# Patient Record
Sex: FEMALE | Race: WHITE | HISPANIC OR LATINO | Employment: FULL TIME | URBAN - METROPOLITAN AREA
[De-identification: names, ages, dates, MRNs, and addresses within clinical notes are randomized per-mention and may not be internally consistent; named-entity substitution may affect disease eponyms.]

---

## 2018-12-11 ENCOUNTER — HOSPITAL ENCOUNTER (EMERGENCY)
Facility: MEDICAL CENTER | Age: 23
End: 2018-12-11
Attending: EMERGENCY MEDICINE
Payer: COMMERCIAL

## 2018-12-11 VITALS
BODY MASS INDEX: 21.73 KG/M2 | OXYGEN SATURATION: 97 % | SYSTOLIC BLOOD PRESSURE: 119 MMHG | TEMPERATURE: 98.1 F | WEIGHT: 110.67 LBS | DIASTOLIC BLOOD PRESSURE: 77 MMHG | HEART RATE: 83 BPM | RESPIRATION RATE: 16 BRPM | HEIGHT: 60 IN

## 2018-12-11 DIAGNOSIS — R55 NEAR SYNCOPE: ICD-10-CM

## 2018-12-11 LAB
ANION GAP SERPL CALC-SCNC: 7 MMOL/L (ref 0–11.9)
BASOPHILS # BLD AUTO: 0.7 % (ref 0–1.8)
BASOPHILS # BLD: 0.07 K/UL (ref 0–0.12)
BUN SERPL-MCNC: 18 MG/DL (ref 8–22)
CALCIUM SERPL-MCNC: 9.6 MG/DL (ref 8.5–10.5)
CHLORIDE SERPL-SCNC: 106 MMOL/L (ref 96–112)
CO2 SERPL-SCNC: 27 MMOL/L (ref 20–33)
CREAT SERPL-MCNC: 0.69 MG/DL (ref 0.5–1.4)
EOSINOPHIL # BLD AUTO: 0.18 K/UL (ref 0–0.51)
EOSINOPHIL NFR BLD: 1.9 % (ref 0–6.9)
ERYTHROCYTE [DISTWIDTH] IN BLOOD BY AUTOMATED COUNT: 42.1 FL (ref 35.9–50)
GLUCOSE SERPL-MCNC: 95 MG/DL (ref 65–99)
HCG SERPL QL: NEGATIVE
HCT VFR BLD AUTO: 41.3 % (ref 37–47)
HGB BLD-MCNC: 13.6 G/DL (ref 12–16)
IMM GRANULOCYTES # BLD AUTO: 0.04 K/UL (ref 0–0.11)
IMM GRANULOCYTES NFR BLD AUTO: 0.4 % (ref 0–0.9)
LYMPHOCYTES # BLD AUTO: 3.41 K/UL (ref 1–4.8)
LYMPHOCYTES NFR BLD: 35.6 % (ref 22–41)
MCH RBC QN AUTO: 29.6 PG (ref 27–33)
MCHC RBC AUTO-ENTMCNC: 32.9 G/DL (ref 33.6–35)
MCV RBC AUTO: 89.8 FL (ref 81.4–97.8)
MONOCYTES # BLD AUTO: 0.89 K/UL (ref 0–0.85)
MONOCYTES NFR BLD AUTO: 9.3 % (ref 0–13.4)
NEUTROPHILS # BLD AUTO: 4.99 K/UL (ref 2–7.15)
NEUTROPHILS NFR BLD: 52.1 % (ref 44–72)
NRBC # BLD AUTO: 0 K/UL
NRBC BLD-RTO: 0 /100 WBC
PLATELET # BLD AUTO: 422 K/UL (ref 164–446)
PMV BLD AUTO: 9.6 FL (ref 9–12.9)
POTASSIUM SERPL-SCNC: 4 MMOL/L (ref 3.6–5.5)
RBC # BLD AUTO: 4.6 M/UL (ref 4.2–5.4)
SODIUM SERPL-SCNC: 140 MMOL/L (ref 135–145)
WBC # BLD AUTO: 9.6 K/UL (ref 4.8–10.8)

## 2018-12-11 PROCEDURE — 36415 COLL VENOUS BLD VENIPUNCTURE: CPT

## 2018-12-11 PROCEDURE — 99283 EMERGENCY DEPT VISIT LOW MDM: CPT

## 2018-12-11 PROCEDURE — 85025 COMPLETE CBC W/AUTO DIFF WBC: CPT

## 2018-12-11 PROCEDURE — 84703 CHORIONIC GONADOTROPIN ASSAY: CPT

## 2018-12-11 PROCEDURE — 93005 ELECTROCARDIOGRAM TRACING: CPT | Performed by: EMERGENCY MEDICINE

## 2018-12-11 PROCEDURE — 80048 BASIC METABOLIC PNL TOTAL CA: CPT

## 2018-12-11 ASSESSMENT — PAIN SCALES - GENERAL: PAINLEVEL_OUTOF10: 0

## 2018-12-12 NOTE — ED PROVIDER NOTES
ER Provider Note     Scribed for Matteo Hobbs M.D. by Peg Calvert. 12/11/2018, 9:07 PM.    Primary Care Provider: No primary care provider on file.  Means of Arrival: Walk-In   History obtained from: Patient  History limited by: None     CHIEF COMPLAINT  Chief Complaint   Patient presents with   • Dizziness   • Shaking     HPI  Dimple Atwood is a 23 y.o. female who presents to the Emergency Department complaining of dizziness describes as lightheadedness feeling as if she might have a syncopal episode onset 3 days ago while at work. She reports a few episodes of these feelings each day, some while she is standing and some while she is sitting. She denies actually having a syncopal episode.  She also reports her heart feeling very slow buy beating very fast. Associated symptoms include fatigue. No modifying factors noted. Denies nausea, vomiting, diarrhea, chills.     REVIEW OF SYSTEMS  See HPI for further details. All other systems are negative.     PAST MEDICAL HISTORY  Denies a past medical history of hypertension or diabetes .    SURGICAL HISTORY  Patient denies any recent surgeries.     SOCIAL HISTORY  Social History   Substance Use Topics   • Smoking status: Never Smoker   • Smokeless tobacco: Never Used   • Alcohol use Yes      Comment: occ      History   Drug Use No       FAMILY HISTORY  History reviewed. No pertinent family history.    CURRENT MEDICATIONS  Home Medications     Reviewed by Carlos Enrique Shelley R.N. (Registered Nurse) on 12/11/18 at 2023  Med List Status: Not Addressed   Medication Last Dose Status        Patient Wali Taking any Medications                       ALLERGIES  No Known Allergies    PHYSICAL EXAM  VITAL SIGNS: /77   Pulse 89   Temp 36.5 °C (97.7 °F) (Temporal)   Resp 16   Ht 1.524 m (5')   Wt 50.2 kg (110 lb 10.7 oz)   LMP 11/27/2018 (Exact Date)   SpO2 100%   BMI 21.61 kg/m²      Constitutional: Alert in no apparent distress.  HENT: No signs of  trauma, Bilateral external ears normal, Nose normal.   Eyes: Pupils are equal and reactive, Conjunctiva normal, Non-icteric.   Neck: Normal range of motion, No tenderness, Supple, No stridor.   Lymphatic: No lymphadenopathy noted.   Cardiovascular: Regular rate and rhythm, no murmurs.   Thorax & Lungs: Normal breath sounds, No respiratory distress, No wheezing, No chest tenderness.   Abdomen: Bowel sounds normal, Soft, No tenderness, No masses, No pulsatile masses. No peritoneal signs.  Skin: Warm, Dry, No erythema, No rash.   Back: No bony tenderness, No CVA tenderness.   Extremities: Intact distal pulses, No edema, No tenderness, No cyanosis.  Musculoskeletal: Good range of motion in all major joints. No tenderness to palpation or major deformities noted.   Neurologic: Alert , Normal motor function, Normal sensory function, No focal deficits noted.   Psychiatric: Affect normal, Judgment normal, Mood normal.       DIAGNOSTIC STUDIES / PROCEDURES    EKG Interpretation:  Interpreted by me at 10:15 PM     12 Lead EKG interpreted by me to show:  No ST-T wave changes and no ectopy with no Brugada syndrome or any hypertrophic cardiomyopathy and no preexcitation and normal intervals    LABS  Labs Reviewed   CBC WITH DIFFERENTIAL - Abnormal; Notable for the following:        Result Value    MCHC 32.9 (*)     Monos (Absolute) 0.89 (*)     All other components within normal limits   BASIC METABOLIC PANEL   HCG QUAL SERUM   ESTIMATED GFR     All labs reviewed by me.    COURSE & MEDICAL DECISION MAKING  Pertinent Labs & Imaging studies reviewed. (See chart for details)    This is a 23 y.o. female that presents who presents with what appears to be a presyncopal episodes.  The patient has been under significant amount of stress lately and this could be contributory.  This could represent a likely derangement versus cardiogenic syncope. In addition this could represent pregnancy..     9:07 PM - Patient seen and examined at  bedside. Plan of care was discussed with the patient who verbalized her understanding and agrees to the plan. Ordered estimated GFR, CBC with differential, BMP, beta-HCG qualitative serum, and EKG.      11:43 PM - Re-evaluated the patient. Labs and ED return precautions discussed. She is encouraged to follow up with her PCP. She verbalized her understanding and agrees to the discharge instructions.     The patient will return for new or worsening symptoms and is stable at the time of discharge.    The patient was found to have no electrolyte derangements.  The patient had no arrhythmia genic cause for syncope.  The patient has no anemia.  She is not pregnant.  At this time I believe this is likely stress related or could be vasovagal.  I will discharge the patient home with strict return precautions.    The patient is referred to a primary physician for blood pressure management, diabetic screening, and for all other preventative health concerns.    DISPOSITION:  Patient will be discharged home in stable condition.    FOLLOW UP:  [address] [address]  [phone]          OUTPATIENT MEDICATIONS:  There are no discharge medications for this patient.          FINAL IMPRESSION  1. Near syncope          Peg GATICA (Gisselle), am scribing for, and in the presence of, Matteo Hobbs M.D..    Electronically signed by: Peg Calvert (Gisselle), 12/11/2018    Matteo GATICA M.D. personally performed the services described in this documentation, as scribed by Peg Calvert in my presence, and it is both accurate and complete. C    The note accurately reflects work and decisions made by me.  Matteo Hobbs  12/12/2018  1:04 AM

## 2018-12-12 NOTE — ED TRIAGE NOTES
"Chief Complaint   Patient presents with   • Dizziness   • Shaking       Pt ambulatory to triage with above complaint.  Pt states she \"felt dizzy and everything felt very slow, and my heart was beating very fast, and I feel tired.\"  Pt states this feeling occurred Friday, Saturday and three time today.      Pt returned to [facility], educated on triage process, and to inform staff of any changes or concerns.    Blood pressure 133/77, pulse 89, temperature 36.5 °C (97.7 °F), temperature source Temporal, resp. rate 16, height 1.524 m (5'), weight 50.2 kg (110 lb 10.7 oz), last menstrual period 11/27/2018, SpO2 100 %.      "

## 2018-12-13 LAB — EKG IMPRESSION: NORMAL

## 2020-07-10 ENCOUNTER — NON-PROVIDER VISIT (OUTPATIENT)
Dept: URGENT CARE | Facility: CLINIC | Age: 25
End: 2020-07-10

## 2020-07-10 DIAGNOSIS — Z02.1 PRE-EMPLOYMENT DRUG SCREENING: ICD-10-CM

## 2020-07-10 LAB
AMP AMPHETAMINE: NORMAL
COC COCAINE: NORMAL
INT CON NEG: NEGATIVE
INT CON POS: POSITIVE
MET METHAMPHETAMINES: NORMAL
OPI OPIATES: NORMAL
PCP PHENCYCLIDINE: NORMAL
POC DRUG COMMENT 753798-OCCUPATIONAL HEALTH: NEGATIVE
THC: NORMAL

## 2020-07-10 PROCEDURE — 80305 DRUG TEST PRSMV DIR OPT OBS: CPT | Performed by: FAMILY MEDICINE

## 2024-09-11 ENCOUNTER — HOSPITAL ENCOUNTER (EMERGENCY)
Facility: MEDICAL CENTER | Age: 29
End: 2024-09-11
Payer: COMMERCIAL

## 2024-09-11 ENCOUNTER — OFFICE VISIT (OUTPATIENT)
Dept: URGENT CARE | Facility: CLINIC | Age: 29
End: 2024-09-11
Payer: COMMERCIAL

## 2024-09-11 VITALS
OXYGEN SATURATION: 97 % | BODY MASS INDEX: 28.54 KG/M2 | HEART RATE: 94 BPM | RESPIRATION RATE: 17 BRPM | DIASTOLIC BLOOD PRESSURE: 80 MMHG | WEIGHT: 145.4 LBS | TEMPERATURE: 97.6 F | SYSTOLIC BLOOD PRESSURE: 120 MMHG | HEIGHT: 60 IN

## 2024-09-11 DIAGNOSIS — G44.89 OTHER HEADACHE SYNDROME: ICD-10-CM

## 2024-09-11 DIAGNOSIS — R42 DIZZINESS: ICD-10-CM

## 2024-09-11 PROCEDURE — 3079F DIAST BP 80-89 MM HG: CPT

## 2024-09-11 PROCEDURE — 3074F SYST BP LT 130 MM HG: CPT

## 2024-09-11 PROCEDURE — 99205 OFFICE O/P NEW HI 60 MIN: CPT

## 2024-09-11 NOTE — PROGRESS NOTES
CHIEF COMPLAINT  Chief Complaint   Patient presents with    Headache     Pt is here today for a headache,confusion and dizziness started at 5am today. Pt states having no head injury prior to symptoms     Subjective:   Dimple Atwood is a 28 y.o. female who presents headache.  Patient reports symptoms started early this morning, with an 8 out of 10 she reports associated symptoms of worsening pain, dizziness and her vision getting into the dark.  She states that at times she felt as though she could not remember what she was doing, and is forgetting chunks of time.  She denies any loss of strength or sensation. No focal weakness. No history of headaches.  Denies any symptoms of fever or chills. Denies history of injury. She denies any  She denies any use of Tylenol or Motrin.  She reports today to urgent care with concerns, as the headache seems to be worsening.       Review of Systems   Constitutional:  Positive for chills. Negative for fever.   Neurological:  Positive for dizziness and headaches. Negative for sensory change, focal weakness and weakness.       PAST MEDICAL HISTORY  There are no problems to display for this patient.      SURGICAL HISTORY  patient denies any surgical history    ALLERGIES  No Known Allergies    CURRENT MEDICATIONS  Home Medications       Reviewed by Rock Blas't (Medical Assistant) on 09/11/24 at 1255  Med List Status: <None>     Medication Last Dose Status        Patient Wali Taking any Medications                           SOCIAL HISTORY  Social History     Tobacco Use    Smoking status: Never    Smokeless tobacco: Never   Vaping Use    Vaping status: Never Used   Substance and Sexual Activity    Alcohol use: Yes     Comment: occ    Drug use: No    Sexual activity: Not on file       FAMILY HISTORY  History reviewed. No pertinent family history.      Medications, Allergies, and current problem list reviewed today in Epic.     Objective:     /80    Pulse 94   Temp 36.4 °C (97.6 °F) (Temporal)   Resp 17   Ht 1.524 m (5')   Wt 66 kg (145 lb 6.4 oz)   SpO2 97%     Physical Exam  Vitals reviewed.   Constitutional:       General: She is not in acute distress.     Appearance: Normal appearance. She is not ill-appearing or toxic-appearing.   HENT:      Head: Normocephalic.      Right Ear: Tympanic membrane normal.      Left Ear: Tympanic membrane normal.      Nose: Nose normal.      Mouth/Throat:      Mouth: Mucous membranes are moist.      Pharynx: Oropharynx is clear.   Cardiovascular:      Rate and Rhythm: Normal rate and regular rhythm.      Pulses: Normal pulses.      Heart sounds: Normal heart sounds.   Pulmonary:      Effort: Pulmonary effort is normal. No respiratory distress.      Breath sounds: Normal breath sounds. No stridor. No wheezing or rales.   Musculoskeletal:      Cervical back: Normal range of motion and neck supple.   Skin:     General: Skin is warm.      Capillary Refill: Capillary refill takes less than 2 seconds.   Neurological:      General: No focal deficit present.      Mental Status: She is alert and oriented to person, place, and time.      GCS: GCS eye subscore is 4. GCS verbal subscore is 5. GCS motor subscore is 6.      Cranial Nerves: No dysarthria or facial asymmetry.      Sensory: No sensory deficit.      Motor: No weakness.      Coordination: Coordination normal.      Gait: Gait is intact.   Psychiatric:         Mood and Affect: Mood normal.         Assessment/Plan:     Diagnosis and associated orders:     1. Other headache syndrome        2. Dizziness           Comments/MDM:     Patient presents urgent care with concerns for sudden onset 8 out of 10 headache this morning with associated symptoms of vision changes no history of head trauma or injury.  Exam patient is alert apparent signs of auscultation bilaterally vital signs are stable in clinic.  Given reports of new onset headache without migraine history, and symptoms of  dizziness, vision changes and memory deficits I did advise patient to follow-up to ER for further evaluation and management.  Patient verbalized understanding is planned.  Transfer center notified.         Differential diagnosis, natural history, supportive care, and indications for immediate follow-up discussed.    Advised the patient to follow-up with the primary care physician for recheck, reevaluation, and consideration of further management.    Please note that this dictation was created using voice recognition software. I have made a reasonable attempt to correct obvious errors, but I expect that there are errors of grammar and possibly content that I did not discover before finalizing the note.    This note was electronically signed by KARRI Sherman

## 2024-09-12 ENCOUNTER — TELEPHONE (OUTPATIENT)
Dept: HEALTH INFORMATION MANAGEMENT | Facility: OTHER | Age: 29
End: 2024-09-12
Payer: COMMERCIAL

## 2024-09-12 ASSESSMENT — ENCOUNTER SYMPTOMS
WEAKNESS: 0
SENSORY CHANGE: 0
FOCAL WEAKNESS: 0
FEVER: 0
HEADACHES: 1
CHILLS: 1
DIZZINESS: 1